# Patient Record
Sex: MALE | Race: WHITE | URBAN - METROPOLITAN AREA
[De-identification: names, ages, dates, MRNs, and addresses within clinical notes are randomized per-mention and may not be internally consistent; named-entity substitution may affect disease eponyms.]

---

## 2017-10-20 ENCOUNTER — OFFICE VISIT (OUTPATIENT)
Dept: URGENT CARE | Facility: URGENT CARE | Age: 57
End: 2017-10-20

## 2017-10-20 ENCOUNTER — HOSPITAL ENCOUNTER (EMERGENCY)
Facility: CLINIC | Age: 57
Discharge: HOME | End: 2017-10-20
Attending: NURSE PRACTITIONER | Admitting: NURSE PRACTITIONER
Payer: COMMERCIAL

## 2017-10-20 VITALS
DIASTOLIC BLOOD PRESSURE: 69 MMHG | HEIGHT: 69 IN | RESPIRATION RATE: 20 BRPM | BODY MASS INDEX: 28.58 KG/M2 | HEART RATE: 101 BPM | OXYGEN SATURATION: 96 % | WEIGHT: 193 LBS | TEMPERATURE: 98 F | SYSTOLIC BLOOD PRESSURE: 126 MMHG

## 2017-10-20 VITALS
HEART RATE: 104 BPM | WEIGHT: 193 LBS | DIASTOLIC BLOOD PRESSURE: 72 MMHG | RESPIRATION RATE: 20 BRPM | HEIGHT: 69 IN | BODY MASS INDEX: 28.58 KG/M2 | OXYGEN SATURATION: 100 % | SYSTOLIC BLOOD PRESSURE: 140 MMHG

## 2017-10-20 DIAGNOSIS — S69.92XA FISH HOOK INJURY OF FINGER, LEFT, INITIAL ENCOUNTER: ICD-10-CM

## 2017-10-20 DIAGNOSIS — S69.92XA FISH HOOK INJURY OF FINGER OF LEFT HAND, INITIAL ENCOUNTER: Primary | ICD-10-CM

## 2017-10-20 PROCEDURE — 90715 TDAP VACCINE 7 YRS/> IM: CPT | Performed by: NURSE PRACTITIONER

## 2017-10-20 PROCEDURE — 90471 IMMUNIZATION ADMIN: CPT

## 2017-10-20 PROCEDURE — 25000128 H RX IP 250 OP 636: Performed by: NURSE PRACTITIONER

## 2017-10-20 PROCEDURE — 99283 EMERGENCY DEPT VISIT LOW MDM: CPT | Mod: 25

## 2017-10-20 PROCEDURE — 10120 INC&RMVL FB SUBQ TISS SMPL: CPT

## 2017-10-20 RX ORDER — ACETAMINOPHEN AND CODEINE PHOSPHATE 300; 30 MG/1; MG/1
1-2 TABLET ORAL EVERY 6 HOURS PRN
Qty: 20 TABLET | Refills: 0 | Status: SHIPPED | OUTPATIENT
Start: 2017-10-20

## 2017-10-20 RX ORDER — CLINDAMYCIN HCL 300 MG
300 CAPSULE ORAL 4 TIMES DAILY
Qty: 40 CAPSULE | Refills: 0 | Status: SHIPPED | OUTPATIENT
Start: 2017-10-20 | End: 2017-10-30

## 2017-10-20 RX ADMIN — CLOSTRIDIUM TETANI TOXOID ANTIGEN (FORMALDEHYDE INACTIVATED), CORYNEBACTERIUM DIPHTHERIAE TOXOID ANTIGEN (FORMALDEHYDE INACTIVATED), BORDETELLA PERTUSSIS TOXOID ANTIGEN (GLUTARALDEHYDE INACTIVATED), BORDETELLA PERTUSSIS FILAMENTOUS HEMAGGLUTININ ANTIGEN (FORMALDEHYDE INACTIVATED), BORDETELLA PERTUSSIS PERTACTIN ANTIGEN, AND BORDETELLA PERTUSSIS FIMBRIAE 2/3 ANTIGEN 0.5 ML: 5; 2; 2.5; 5; 3; 5 INJECTION, SUSPENSION INTRAMUSCULAR at 21:16

## 2017-10-20 ASSESSMENT — ENCOUNTER SYMPTOMS
WOUND: 1
WEAKNESS: 0
NUMBNESS: 0

## 2017-10-20 ASSESSMENT — PAIN SCALES - GENERAL: PAINLEVEL: MODERATE PAIN (4)

## 2017-10-20 NOTE — NURSING NOTE
"Chief Complaint   Patient presents with     Trauma     fish hook in left index finger happened about 2 pm       Initial /72 (BP Location: Right arm, Patient Position: Sitting, Cuff Size: Adult Regular)  Pulse 104  Resp 20  Ht 5' 9\" (1.753 m)  Wt 193 lb (87.5 kg)  SpO2 100%  BMI 28.5 kg/m2 Estimated body mass index is 28.5 kg/(m^2) as calculated from the following:    Height as of this encounter: 5' 9\" (1.753 m).    Weight as of this encounter: 193 lb (87.5 kg).  Medication Reconciliation: complete   Sharmila Merrill, Medical Assistant      "

## 2017-10-20 NOTE — ED AVS SNAPSHOT
Emergency Department    [address] [facility] [address]    Phone:  [phone]    Fax:  [phone]                                       Mike Mohr   MRN: [id number]    Department:   Emergency Department   Date of Visit:  10/20/2017           Patient Information     Date Of Birth          1960        Your diagnoses for this visit were:     Fish hook injury of finger, left, initial encounter removed       You were seen by Katie Pulido, CNP.      Follow-up Information     Follow up with Marco Seaman MD In 3 days.    Specialty:  Family Practice    Why:  or your primary provider for recheck    Contact information:    [address]  [facility] [address] [phone]          Follow up with  Emergency Department.    Specialty:  EMERGENCY MEDICINE    Why:  As needed, If symptoms worsen    Contact information:    [address] [facility] [address] [phone]      Discharge References/Attachments     FISH HOOK REMOVAL (ENGLISH)      24 Hour Appointment Hotline       To make an appointment at any [facility], call [phone] ([phone]). If you don't have a family doctor or clinic, we will help you find one. [facility] clinics are conveniently located to serve the needs of you and your family.             Review of your medicines      START taking        Dose / Directions Last dose taken    acetaminophen-codeine 300-30 MG per tablet   Commonly known as:  TYLENOL WITH CODEINE #3   Dose:  1-2 tablet   Quantity:  20 tablet        Take 1-2 tablets by mouth every 6 hours as needed for pain   Refills:  0        clindamycin 300 MG capsule   Commonly known as:  CLEOCIN   Dose:  300 mg   Quantity:  40 capsule        Take 1 capsule (300 mg) by mouth 4 times daily for 10 days   Refills:  0                Prescriptions were sent or printed at these locations (2 Prescriptions)                   Other Prescriptions                Printed at Department/Unit  printer (2 of 2)         clindamycin (CLEOCIN) 300 MG capsule               acetaminophen-codeine (TYLENOL WITH CODEINE #3) 300-30 MG per tablet                Orders Needing Specimen Collection     None      Pending Results     No orders found from 10/18/2017 to 10/21/2017.            Pending Culture Results     No orders found from 10/18/2017 to 10/21/2017.            Pending Results Instructions     If you had any lab results that were not finalized at the time of your Discharge, you can call the ED Lab Result RN at [phone]. You will be contacted by this team for any positive Lab results or changes in treatment. The nurses are available 7 days a week from 10A to 6:30P.  You can leave a message 24 hours per day and they will return your call.        Test Results From Your Hospital Stay               Clinical Quality Measure: Blood Pressure Screening     Your blood pressure was checked while you were in the emergency department today. The last reading we obtained was  BP: 126/69 . Please read the guidelines below about what these numbers mean and what you should do about them.  If your systolic blood pressure (the top number) is less than 120 and your diastolic blood pressure (the bottom number) is less than 80, then your blood pressure is normal. There is nothing more that you need to do about it.  If your systolic blood pressure (the top number) is 120-139 or your diastolic blood pressure (the bottom number) is 80-89, your blood pressure may be higher than it should be. You should have your blood pressure rechecked within a year by a primary care provider.  If your systolic blood pressure (the top number) is 140 or greater or your diastolic blood pressure (the bottom number) is 90 or greater, you may have high blood pressure. High blood pressure is treatable, but if left untreated over time it can put you at risk for heart attack, stroke, or kidney failure. You should have your blood pressure rechecked by a  "primary care provider within the next 4 weeks.  If your provider in the emergency department today gave you specific instructions to follow-up with your doctor or provider even sooner than that, you should follow that instruction and not wait for up to 4 weeks for your follow-up visit.        Thank you for choosing [facility]       Thank you for choosing [facility] for your care. Our goal is always to provide you with excellent care. Hearing back from our patients is one way we can continue to improve our services. Please take a few minutes to complete the written survey that you may receive in the mail after you visit with us. Thank you!        OurStage Information     OurStage lets you send messages to your doctor, view your test results, renew your prescriptions, schedule appointments and more. To sign up, go to [email or web address] . Click on \"Log in\" on the left side of the screen, which will take you to the Welcome page. Then click on \"Sign up Now\" on the right side of the page.     You will be asked to enter the access code listed below, as well as some personal information. Please follow the directions to create your username and password.     Your access code is: [email or web address]  Expires: 2018  6:40 PM     Your access code will  in 90 days. If you need help or a new code, please call your [facility] clinic or [phone].        Care EveryWhere ID     This is your Care EveryWhere ID. This could be used by other organizations to access your [facility] medical records  [id number]        Equal Access to Services     DOMINIK WOODY : Hadii sowmya merrill Sokartik, waaxda luulises, qaybta kaalmada alan perez . So [facility] [phone].    ATENCIÓN: Si habla español, tiene a cortez disposición servicios gratuitos de asistencia lingüística. Llame al [phone].    We comply with applicable federal civil rights laws and [place] laws. We do not discriminate on the basis of " race, color, national origin, age, disability, sex, sexual orientation, or gender identity.            After Visit Summary       This is your record. Keep this with you and show to your community pharmacist(s) and doctor(s) at your next visit.

## 2017-10-20 NOTE — MR AVS SNAPSHOT
"              After Visit Summary   10/20/2017    Mike Mohr    MRN: [id number]           Patient Information     Date Of Birth          1960        Visit Information        Provider Department      10/20/2017 5:55 PM Provider, Freddy Eduardo MD [facility]        Today's Diagnoses     Fish hook injury of finger of left hand, initial encounter    -  1       Follow-ups after your visit        Who to contact     If you have questions or need follow up information about today's clinic visit or your schedule please contact [facility] directly at [phone].  Normal or non-critical lab and imaging results will be communicated to you by MyChart, letter or phone within 4 business days after the clinic has received the results. If you do not hear from us within 7 days, please contact the clinic through [facility] or phone. If you have a critical or abnormal lab result, we will notify you by phone as soon as possible.  Submit refill requests through [employer] or call your pharmacy and they will forward the refill request to us. Please allow 3 business days for your refill to be completed.          Additional Information About Your Visit        MyChart Information     Medigo lets you send messages to your doctor, view your test results, renew your prescriptions, schedule appointments and more. To sign up, go to [email or web address] . Click on \"Log in\" on the left side of the screen, which will take you to the Welcome page. Then click on \"Sign up Now\" on the right side of the page.     You will be asked to enter the access code listed below, as well as some personal information. Please follow the directions to create your username and password.     Your access code is: [email or web address]  Expires: 2018  6:40 PM     Your access code will  in 90 days. If you need help or a new code, please call your [facility] clinic or [phone].        Care EveryWhere ID " "    This is your Care EveryWhere ID. This could be used by other organizations to access your [facility] medical records  [id number]        Your Vitals Were     Pulse Respirations Height Pulse Oximetry BMI (Body Mass Index)       104 20 5' 9\" (1.753 m) 100% 28.5 kg/m2        Blood Pressure from Last 3 Encounters:   10/20/17 140/72   06/28/15 120/66   12/04/13 104/76    Weight from Last 3 Encounters:   10/20/17 193 lb (87.5 kg)   06/28/15 193 lb (87.5 kg)   12/04/13 187 lb 4.8 oz (85 kg)              Today, you had the following     No orders found for display       Primary Care Provider    Physician No Ref-Primary       NO REF-PRIMARY PHYSICIAN        Equal Access to Services     DOMINIK WOODY : Hadii sowmya gonzaleso Soomaali, waaxda luqadaha, qaybta kaalmada adeegyada, alan duarte . So [facility] [phone].    ATENCIÓN: Si habla español, tiene a cortez disposición servicios gratuitos de asistencia lingüística. Llame al [phone].    We comply with applicable federal civil rights laws and [place] laws. We do not discriminate on the basis of race, color, national origin, age, disability, sex, sexual orientation, or gender identity.            Thank you!     Thank you for choosing [facility]  for your care. Our goal is always to provide you with excellent care. Hearing back from our patients is one way we can continue to improve our services. Please take a few minutes to complete the written survey that you may receive in the mail after your visit with us. Thank you!             Your Updated Medication List - Protect others around you: Learn how to safely use, store and throw away your medicines at www.disposemymeds.org.          This list is accurate as of: 10/20/17  6:40 PM.  Always use your most recent med list.                   Brand Name Dispense Instructions for use Diagnosis    tadalafil 2.5 MG tablet    CIALIS    30 tablet    Take 2.5 mg by mouth daily " Never use with nitroglycerin, terazosin or doxazosin.    ED (erectile dysfunction)

## 2017-10-20 NOTE — ED AVS SNAPSHOT
Emergency Department    [address] [place] [address]    Phone:  [phone]    Fax:  [phone]                                       Mike Mohr   MRN: [id number]    Department:   Emergency Department   Date of Visit:  10/20/2017           After Visit Summary Signature Page     I have received my discharge instructions, and my questions have been answered. I have discussed any challenges I see with this plan with the nurse or doctor.    ..........................................................................................................................................  Patient/Patient Representative Signature      ..........................................................................................................................................  Patient Representative Print Name and Relationship to Patient    ..................................................               ................................................  Date                                            Time    ..........................................................................................................................................  Reviewed by Signature/Title    ...................................................              ..............................................  Date                                                            Time

## 2017-10-21 NOTE — ED PROVIDER NOTES
"  History     Chief Complaint:  Foreign Body in Skin    HPI   Mike Mohr is a 57 year old male who presents to the emergency department today for evaluation of a fish hook stuck in his left index finger. The patient was attempting to remove a barbed fish hook from the mouth of a fish at 1400 today when the hook became embedded in the volar aspect of his left index finger. He tried taking it out, but was unable to. He denies numbness, weakness, and is not up to date on tetanus.    Allergies:  No Known Drug Allergies    Medications:    The patient is currently on no regular medications.      Past Medical History:    History reviewed. No pertinent past medical history.    Past Surgical History:    History reviewed. No pertinent past surgical history.    Family History:    History reviewed. No pertinent family history.     Social History:  The patient was unaccompanied to the ED.  Smoking Status: Current Every Day Smoker  Smokeless Tobacco: Never used  Alcohol Use: Yes  Marital Status:   [2]     Review of Systems   Skin: Positive for wound.   Neurological: Negative for weakness and numbness.   All other systems reviewed and are negative.    Physical Exam   Vitals:  Patient Vitals for the past 24 hrs:   BP Temp Temp src Pulse Resp SpO2 Height Weight   10/20/17 1936 126/69 98  F (36.7  C) Oral 101 20 96 % 1.753 m (5' 9\") 87.5 kg (193 lb)     Physical Exam  Nursing notes reviewed. Vitals reviewed.  General: Alert. Well kept.  Eyes:  Conjunctiva non-injected, non-icteric.  Neck/Throat: Moist mucous membranes. Normal voice.  Cardiac: Regular rhythm. Normal heart sounds.  Pulmonary: Clear and equal breath sounds bilaterally.   Musculoskeletal: Normal gross range of motion of all 4 extremities. Patient able to flex at PIP and DIP joints with fishhook in place.   Neurological: Alert and oriented x4. Sensation intact to radial and ulnar aspects.  Skin: Warm and dry. No rashes or petechiae. Left index finger: Fish hook " through the volar surface between PIP and DIP joints, able to palpate length of fishhook under the skin.  Psych: Affect normal. Good eye contact.    Emergency Department Course     Procedures:    Fish hook removal        LOCATION:  Barbed fish hook through volar surface between PIP and DIP joints.    FUNCTION:  Distally sensation, circulation and tendon function are intact.    ANESTHESIA:  Digital block using 0.25% bupivacaine total of 6 mLs.    PREPARATION:  Irrigation with Normal Saline, Betadine and Shur Clens.    PROCEDURE: An single straight 0.25 cm incision was made overlying the mariama. The fish hook mariama was then pushed through the surface and using bolt cutters, the mariama was cut off. The hook was pushed back through and was successfully removed. The puncture sites were irrigated with normal saline high pressure. There were no immediate complications. The patient tolerated the procedure well.     Interventions:  2116 Tdap 0.5 mL IM     Emergency Department Course:  Nursing notes and vitals reviewed.  I performed an exam of the patient as documented above.   2124: The fish hook was removed as noted above.  I discussed the treatment plan with the patient. He expressed understanding of this plan and consented to discharge. He will be discharged home with instructions for care and follow up. In addition, the patient will return to the emergency department if their symptoms worsen, if new symptoms arise or if there is any concern.  All questions were answered.    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 I discussed that I would like to perform an xray of the finger to look for fracture or joint injury and patient declines as we are able to feel the length of the fish hook just under the surface of the skin and he is able to  ROM his finger at each joint without joint or bony pain.  He is competent and capable to make this decision. The fish hook was successfully removed, see above procedure note. No signs of complications of the foreign body including abscess, cellulitis, necrotizing fascitis, penetration of vascular or nerve structures, etc. Due to the nature of the puncture wounds, fresh water exposure, and increased risk of infection, they were not closed and he will be started on prophylactic antibiotics. The patient's tetanus also was updated here in the emergency department. He will follow up with primary care for wound check and was given a small prescription of pain medication.    Diagnosis:    ICD-10-CM    1. Fish hook injury of finger, left, initial encounter S69.92XA     removed     Disposition:   Home    Discharge Medications:  New Prescriptions    ACETAMINOPHEN-CODEINE (TYLENOL WITH CODEINE #3) 300-30 MG PER TABLET    Take 1-2 tablets by mouth every 6 hours as needed for pain    CLINDAMYCIN (CLEOCIN) 300 MG CAPSULE    Take 1 capsule (300 mg) by mouth 4 times daily for 10 days     Scribe Disclosure:  I, Elio Walker, am serving as a scribe at 8:42 PM on 10/20/2017 to document services personally performed by Katie Pulido CNP, based on my observations and the provider's statements to me.    10/20/2017    EMERGENCY DEPARTMENT       Katie Pulido CNP  10/21/17 0102